# Patient Record
Sex: MALE | Race: BLACK OR AFRICAN AMERICAN | NOT HISPANIC OR LATINO | Employment: PART TIME | URBAN - METROPOLITAN AREA
[De-identification: names, ages, dates, MRNs, and addresses within clinical notes are randomized per-mention and may not be internally consistent; named-entity substitution may affect disease eponyms.]

---

## 2017-11-02 PROBLEM — Z12.11 SCREENING FOR COLON CANCER: Status: ACTIVE | Noted: 2017-11-02

## 2018-07-11 PROBLEM — K40.90 DIRECT INGUINAL HERNIA: Status: ACTIVE | Noted: 2018-07-11

## 2018-08-15 PROBLEM — K40.91 RECURRENT RIGHT INGUINAL HERNIA: Status: ACTIVE | Noted: 2018-08-15

## 2018-08-24 PROBLEM — Z12.11 SCREENING FOR COLON CANCER: Status: RESOLVED | Noted: 2017-11-02 | Resolved: 2018-08-24

## 2018-11-06 PROBLEM — K40.90 DIRECT INGUINAL HERNIA: Status: RESOLVED | Noted: 2018-07-11 | Resolved: 2018-11-06

## 2018-11-06 PROBLEM — M54.12 LEFT CERVICAL RADICULOPATHY: Status: ACTIVE | Noted: 2018-11-06

## 2018-11-06 PROBLEM — K40.91 RECURRENT RIGHT INGUINAL HERNIA: Status: RESOLVED | Noted: 2018-08-15 | Resolved: 2018-11-06

## 2018-11-06 PROBLEM — E78.5 HLD (HYPERLIPIDEMIA): Status: ACTIVE | Noted: 2018-11-06

## 2018-11-06 PROBLEM — M25.511 CHRONIC RIGHT SHOULDER PAIN: Status: ACTIVE | Noted: 2018-11-06

## 2018-11-06 PROBLEM — G89.29 CHRONIC RIGHT SHOULDER PAIN: Status: ACTIVE | Noted: 2018-11-06

## 2019-01-14 PROBLEM — G56.22 CUBITAL TUNNEL SYNDROME ON LEFT: Status: ACTIVE | Noted: 2019-01-14

## 2019-03-04 PROBLEM — G89.29 CHRONIC RIGHT SHOULDER PAIN: Status: RESOLVED | Noted: 2018-11-06 | Resolved: 2019-03-04

## 2019-03-04 PROBLEM — M25.511 CHRONIC RIGHT SHOULDER PAIN: Status: RESOLVED | Noted: 2018-11-06 | Resolved: 2019-03-04

## 2021-06-22 PROBLEM — H04.123 DRY EYES: Status: ACTIVE | Noted: 2021-06-22

## 2021-06-22 PROBLEM — H40.003 GLAUCOMA SUSPECT OF BOTH EYES: Status: ACTIVE | Noted: 2021-06-22

## 2021-06-22 PROBLEM — Z86.69 HX OF GLAUCOMA: Status: ACTIVE | Noted: 2021-06-22

## 2023-01-24 DIAGNOSIS — U07.1 COVID-19 VIRUS DETECTED: ICD-10-CM

## 2023-08-18 PROBLEM — N52.9 ED (ERECTILE DYSFUNCTION): Status: ACTIVE | Noted: 2023-08-18

## 2023-08-21 ENCOUNTER — PATIENT OUTREACH (OUTPATIENT)
Dept: ADMINISTRATIVE | Facility: HOSPITAL | Age: 63
End: 2023-08-21

## 2024-12-31 ENCOUNTER — PATIENT OUTREACH (OUTPATIENT)
Dept: ADMINISTRATIVE | Facility: HOSPITAL | Age: 64
End: 2024-12-31

## 2024-12-31 NOTE — PROGRESS NOTES
Chart reviewed, immunization record updated.  No new results noted on [facility] or [facility] web site.  Care Everywhere updated.   Patient care coordination note  Next PCP visit 02/14/2025  LOV with PCP 08/14/2024    Spoke to pt and offered smoking cessation services, he declined. I attempted to update his smoking history, he declined.